# Patient Record
Sex: FEMALE | Race: WHITE | NOT HISPANIC OR LATINO
[De-identification: names, ages, dates, MRNs, and addresses within clinical notes are randomized per-mention and may not be internally consistent; named-entity substitution may affect disease eponyms.]

---

## 2022-05-23 ENCOUNTER — APPOINTMENT (OUTPATIENT)
Dept: ORTHOPEDIC SURGERY | Facility: CLINIC | Age: 72
End: 2022-05-23
Payer: MEDICARE

## 2022-05-23 VITALS — HEIGHT: 61.5 IN | WEIGHT: 232 LBS | BODY MASS INDEX: 43.24 KG/M2

## 2022-05-23 DIAGNOSIS — Z00.00 ENCOUNTER FOR GENERAL ADULT MEDICAL EXAMINATION W/OUT ABNORMAL FINDINGS: ICD-10-CM

## 2022-05-23 DIAGNOSIS — Z60.2 PROBLEMS RELATED TO LIVING ALONE: ICD-10-CM

## 2022-05-23 DIAGNOSIS — I10 ESSENTIAL (PRIMARY) HYPERTENSION: ICD-10-CM

## 2022-05-23 DIAGNOSIS — I51.9 HEART DISEASE, UNSPECIFIED: ICD-10-CM

## 2022-05-23 PROCEDURE — 99213 OFFICE O/P EST LOW 20 MIN: CPT

## 2022-05-23 PROCEDURE — 73564 X-RAY EXAM KNEE 4 OR MORE: CPT | Mod: 50

## 2022-05-23 RX ORDER — ERGOCALCIFEROL 1.25 MG/1
1.25 MG CAPSULE, LIQUID FILLED ORAL
Qty: 24 | Refills: 0 | Status: ACTIVE | COMMUNITY
Start: 2021-12-23

## 2022-05-23 RX ORDER — ATENOLOL 25 MG/1
25 TABLET ORAL
Qty: 90 | Refills: 0 | Status: ACTIVE | COMMUNITY
Start: 2021-12-23

## 2022-05-23 RX ORDER — ZOLPIDEM TARTRATE 10 MG/1
10 TABLET ORAL
Qty: 30 | Refills: 0 | Status: ACTIVE | COMMUNITY
Start: 2022-04-22

## 2022-05-23 RX ORDER — AMLODIPINE BESYLATE AND BENAZEPRIL HYDROCHLORIDE 10; 40 MG/1; MG/1
10-40 CAPSULE ORAL
Qty: 90 | Refills: 0 | Status: ACTIVE | COMMUNITY
Start: 2021-12-23

## 2022-05-23 RX ORDER — SERTRALINE HYDROCHLORIDE 100 MG/1
100 TABLET, FILM COATED ORAL
Qty: 90 | Refills: 0 | Status: ACTIVE | COMMUNITY
Start: 2021-12-23

## 2022-05-23 SDOH — SOCIAL STABILITY - SOCIAL INSECURITY: PROBLEMS RELATED TO LIVING ALONE: Z60.2

## 2022-05-23 NOTE — HISTORY OF PRESENT ILLNESS
[Gradual] : gradual [4] : 4 [0] : 0 [Radiating] : radiating [Nothing helps with pain getting better] : Nothing helps with pain getting better [Sitting] : sitting [Standing] : standing [Walking] : walking [Stairs] : stairs [Retired] : Work status: retired [de-identified] : last note: 06/21/2021: 69YO female patient present for left knee pain, PT denies trauma, PT reports\par weakness, and pain. PT report advil and ice use. PT reports feeling better today.  [] : no [FreeTextEntry1] : knees [FreeTextEntry5] : left knee pain on and off for 3 months\par h/o stenosis and has been treated for it on and off and was seen last summer for rt knee [FreeTextEntry7] : back [de-identified] : rt knee xray  last year [de-identified] : does take 800 mg Ibuprofen

## 2022-05-23 NOTE — PROCEDURE
[Large Joint Injection] : Large joint injection [Bilateral] : bilaterally of the [Knee] : knee [Pain] : pain [Inflammation] : inflammation [X-ray evidence of Osteoarthritis on this or prior visit] : x-ray evidence of Osteoarthritis on this or prior visit [Alcohol] : alcohol [Betadine] : betadine [Ethyl Chloride sprayed topically] : ethyl chloride sprayed topically [Sterile technique used] : sterile technique used [Euflexxa] : Euflexxa [#1] : series #1 [] : Patient tolerated procedure well [Call if redness, pain or fever occur] : call if redness, pain or fever occur [Apply ice for 15min out of every hour for the next 12-24 hours as tolerated] : apply ice for 15 minutes out of every hour for the next 12-24 hours as tolerated [Patient was advised to rest the joint(s) for ____ days] : patient was advised to rest the joint(s) for [unfilled] days [Patient had decreased mobility in the joint] : patient had decreased mobility in the joint [Risks, benefits, alternatives discussed / Verbal consent obtained] : the risks benefits, and alternatives have been discussed, and verbal consent was obtained

## 2022-05-23 NOTE — PHYSICAL EXAM
[Bilateral] : knee bilaterally [Orientated] : orientated [Able to Communicate] : able to communicate [Normal Skin] : normal skin [] : no atrophy [TWNoteComboBox7] : flexion 130 degrees [de-identified] : extension 3 degrees

## 2022-05-23 NOTE — REASON FOR VISIT
[FreeTextEntry2] : follow up rt knee and left knee now bothering her, she is here with a family member. +NSAIDs use.

## 2022-05-23 NOTE — DISCUSSION/SUMMARY
[de-identified] : The documentation recorded by the scribe accurately reflects the service I personally performed and the decisions made by me.\par \par I, Delta Rhodes, attest that this documentation has been prepared under the direction and in the presence of Provider Andres Azul MD.\par \par

## 2022-06-06 ENCOUNTER — APPOINTMENT (OUTPATIENT)
Dept: ORTHOPEDIC SURGERY | Facility: CLINIC | Age: 72
End: 2022-06-06
Payer: MEDICARE

## 2022-06-06 VITALS — BODY MASS INDEX: 43.24 KG/M2 | WEIGHT: 232 LBS | HEIGHT: 61.5 IN

## 2022-06-06 PROCEDURE — 99212 OFFICE O/P EST SF 10 MIN: CPT | Mod: 25

## 2022-06-06 PROCEDURE — 20610 DRAIN/INJ JOINT/BURSA W/O US: CPT | Mod: 50

## 2022-06-06 NOTE — PROCEDURE
[Large Joint Injection] : Large joint injection [Bilateral] : bilaterally of the [Knee] : knee [Pain] : pain [Inflammation] : inflammation [X-ray evidence of Osteoarthritis on this or prior visit] : x-ray evidence of Osteoarthritis on this or prior visit [Alcohol] : alcohol [Betadine] : betadine [Ethyl Chloride sprayed topically] : ethyl chloride sprayed topically [Sterile technique used] : sterile technique used [Euflexxa] : Euflexxa [#2] : series #2 [] : Patient tolerated procedure well [Call if redness, pain or fever occur] : call if redness, pain or fever occur [Apply ice for 15min out of every hour for the next 12-24 hours as tolerated] : apply ice for 15 minutes out of every hour for the next 12-24 hours as tolerated [Patient was advised to rest the joint(s) for ____ days] : patient was advised to rest the joint(s) for [unfilled] days [Patient had decreased mobility in the joint] : patient had decreased mobility in the joint [Risks, benefits, alternatives discussed / Verbal consent obtained] : the risks benefits, and alternatives have been discussed, and verbal consent was obtained

## 2022-06-06 NOTE — DISCUSSION/SUMMARY
[de-identified] : Progress note completed by LIZBETH Carlton.\par \par Physician Instructions:\par The documentation recorded by the PA accurately reflects the service I personally performed and decisions made by me.\par \par

## 2022-06-06 NOTE — PHYSICAL EXAM
[Orientated] : orientated [Able to Communicate] : able to communicate [Normal Skin] : normal skin [Bilateral] : knee bilaterally [] : no atrophy [TWNoteComboBox7] : flexion 130 degrees [de-identified] : extension 3 degrees

## 2022-06-06 NOTE — HISTORY OF PRESENT ILLNESS
[4] : 4 [Localized] : localized [Injection therapy] : injection therapy [Retired] : Work status: retired [2] : 2 [Euflexxa] : Euflexxa [de-identified] : last note: \par 5/23/22: follow up rt knee and left knee now bothering her, she is here with a family member. +NSAIDs use. \par \par 06/21/2021: 71YO female patient present for left knee pain, PT denies trauma, PT reports\par weakness, and pain. PT report advil and ice use. PT reports feeling better today.  [] : no [FreeTextEntry1] : bilat knees [de-identified] : 5-23-22 [de-identified] : bilat knees

## 2022-06-06 NOTE — ASSESSMENT
[FreeTextEntry1] : 6/21/21: Xray of the left knee reveals global adv/endstage OA.\par Advised patient of xray findings with substantial OA.\par Discussed options, ptherapy, nsaids, CSI injections, gel injections\par Intends to travel, [place]par \par 5/23/22: Underlying pathology reviewed and treatment options discussed. \par Xray of the right knee reveals adv PF and medial compartment OA. \par Patient elected to commence with Euflexxa injections for B/l knees. \par Apply ice to affected area.\par preferred Euflexxa over steroid injection. She states the level of pain is tolerable. \par Questions addressed.\par \par 6/6/22: Euflexxa #2 bilateral knees tolerated well.\par RTO in 1 week.

## 2022-06-13 ENCOUNTER — APPOINTMENT (OUTPATIENT)
Dept: ORTHOPEDIC SURGERY | Facility: CLINIC | Age: 72
End: 2022-06-13

## 2022-06-13 VITALS — HEIGHT: 61.5 IN | BODY MASS INDEX: 43.24 KG/M2 | WEIGHT: 232 LBS

## 2022-06-13 PROCEDURE — 20610 DRAIN/INJ JOINT/BURSA W/O US: CPT | Mod: 50

## 2022-06-13 PROCEDURE — 99213 OFFICE O/P EST LOW 20 MIN: CPT | Mod: 25

## 2022-06-13 NOTE — HISTORY OF PRESENT ILLNESS
[Euflexxa] : Euflexxa [4] : 4 [Localized] : localized [Injection therapy] : injection therapy [Walking] : walking [Retired] : Work status: retired [2] : 2 [de-identified] : last note: \par 5/23/22: follow up rt knee and left knee now bothering her, she is here with a family member. +NSAIDs use. \par \par 06/21/2021: 69YO female patient present for left knee pain, PT denies trauma, PT reports\par weakness, and pain. PT report advil and ice use. PT reports feeling better today. \par \par 6/6/22: Euflexxa #2 bilateral knees. \par  [] : no [FreeTextEntry1] : bilat knees [de-identified] : 5-23-22 [de-identified] : bilat knees [de-identified] : euflexxa

## 2022-06-13 NOTE — PHYSICAL EXAM
[Orientated] : orientated [Able to Communicate] : able to communicate [Normal Skin] : normal skin [Bilateral] : knee bilaterally [] : no atrophy [TWNoteComboBox7] : flexion 130 degrees [de-identified] : extension 3 degrees

## 2022-06-13 NOTE — DISCUSSION/SUMMARY
[de-identified] : The documentation recorded by the scribe accurately reflects the service I personally performed and the decisions made by me.\par I, Sandrine Thakkar, attest that this documentation has been prepared under the direction and in the presence of Provider Andres Azul MD.\par \par The patient was seen by Andres Azul MD\par

## 2022-06-13 NOTE — ASSESSMENT
[FreeTextEntry1] : 6/21/21: Xray of the left knee reveals global adv/endstage OA.\par Advised patient of xray findings with substantial OA.\par Discussed options, ptherapy, nsaids, CSI injections, gel injections\par Intends to travel, may do injections [place]state.\par \par 5/23/22: Underlying pathology reviewed and treatment options discussed. \par Xray of the right knee reveals adv PF and medial compartment OA. \par Patient elected to commence with Euflexxa injections for B/l knees. \par Apply ice to affected area.\par preferred Euflexxa over steroid injection. She states the level of pain is tolerable. \par Questions addressed.\par \par 6/6/22: Euflexxa #2 bilateral knees tolerated well.\par RTO in 1 week\par \par 6/13/22: Euflexxa #3 bilateral knees tolerated well.\par RTO 6 weeks/prn.

## 2022-06-13 NOTE — PROCEDURE
[Large Joint Injection] : Large joint injection [Bilateral] : bilaterally of the [Knee] : knee [Pain] : pain [Inflammation] : inflammation [X-ray evidence of Osteoarthritis on this or prior visit] : x-ray evidence of Osteoarthritis on this or prior visit [Alcohol] : alcohol [Betadine] : betadine [Ethyl Chloride sprayed topically] : ethyl chloride sprayed topically [Sterile technique used] : sterile technique used [Euflexxa] : Euflexxa [#3] : series #3 [] : Patient tolerated procedure well [Call if redness, pain or fever occur] : call if redness, pain or fever occur [Apply ice for 15min out of every hour for the next 12-24 hours as tolerated] : apply ice for 15 minutes out of every hour for the next 12-24 hours as tolerated [Patient was advised to rest the joint(s) for ____ days] : patient was advised to rest the joint(s) for [unfilled] days [Patient had decreased mobility in the joint] : patient had decreased mobility in the joint [Risks, benefits, alternatives discussed / Verbal consent obtained] : the risks benefits, and alternatives have been discussed, and verbal consent was obtained

## 2022-11-14 ENCOUNTER — APPOINTMENT (OUTPATIENT)
Dept: ORTHOPEDIC SURGERY | Facility: CLINIC | Age: 72
End: 2022-11-14

## 2022-11-25 ENCOUNTER — FORM ENCOUNTER (OUTPATIENT)
Age: 72
End: 2022-11-25

## 2022-11-26 ENCOUNTER — APPOINTMENT (OUTPATIENT)
Dept: MRI IMAGING | Facility: CLINIC | Age: 72
End: 2022-11-26

## 2022-11-26 PROCEDURE — 70551 MRI BRAIN STEM W/O DYE: CPT | Mod: MH

## 2023-04-17 ENCOUNTER — APPOINTMENT (OUTPATIENT)
Dept: ORTHOPEDIC SURGERY | Facility: CLINIC | Age: 73
End: 2023-04-17
Payer: MEDICARE

## 2023-04-17 VITALS — WEIGHT: 214 LBS | HEIGHT: 61 IN | BODY MASS INDEX: 40.4 KG/M2

## 2023-04-17 PROCEDURE — 99214 OFFICE O/P EST MOD 30 MIN: CPT

## 2023-04-17 RX ORDER — GABAPENTIN 100 MG/1
100 CAPSULE ORAL 3 TIMES DAILY
Qty: 42 | Refills: 0 | Status: ACTIVE | COMMUNITY
Start: 2023-04-17 | End: 1900-01-01

## 2023-04-17 NOTE — REVIEW OF SYSTEMS
----- Message from Jeromy Santos MD sent at 4/12/2018  8:47 AM CDT -----  1 hr glucola 155.   Needs 3 hr GTT [Joint Pain] : joint pain [Negative] : Heme/Lymph

## 2023-04-18 NOTE — ASSESSMENT
[FreeTextEntry1] : 6/21/21: Xray of the left knee reveals global adv/endstage OA.\par Advised patient of xray findings with substantial OA.\par Discussed options, ptherapy, nsaids, CSI injections, gel injections\par Intends to travel, may do injections upstate.\par \par 5/23/22: Underlying pathology reviewed and treatment options discussed. \par Xray of the right knee reveals adv PF and medial compartment OA. \par Patient elected to commence with Euflexxa injections for B/l knees. \par Apply ice to affected area.\par preferred Euflexxa over steroid injection. She states the level of pain is tolerable. \par Questions addressed.\par \par 6/6/22: Euflexxa #2 bilateral knees tolerated well.\par RTO in 1 week\par \par 6/13/22: Euflexxa #3 bilateral knees tolerated well.\par RTO 6 weeks/prn.\par \par 04/17/2023: She states haven taken her friends gabapentin with good relief and requested a prescription. \par gabapentin for 2 wks was prescribed. Side affects discussed. \par She was advised she must follow up with her internist for continuing RX. \par Questions addressed.\par Activity modifier as tolerated.\par \par The documentation recorded by the scribe accurately reflects the service I personally performed and the decisions made by me.\par I, Sandrine Thakkar, attest that this documentation has been prepared under the direction and in the presence of Provider Andres Azul MD.\par \par The patient was seen by Andres Azul MD.\par

## 2023-04-18 NOTE — PHYSICAL EXAM
[Orientated] : orientated [Able to Communicate] : able to communicate [Normal Skin] : normal skin [Bilateral] : knee bilaterally [] : no atrophy [TWNoteComboBox7] : flexion 130 degrees [de-identified] : extension 3 degrees

## 2023-04-18 NOTE — HISTORY OF PRESENT ILLNESS
[0] : 0 [Localized] : localized [Injection therapy] : injection therapy [Retired] : Work status: retired [de-identified] : 4/17/23: This is a 72 year old F with left knee pain after a fall in a parking garage. [] : no [FreeTextEntry1] : left knee [FreeTextEntry5] : pt fell in a parking garage and went to urgent care, had some achiness but is feeling better [FreeTextEntry9] : gabapentin

## 2023-11-27 ENCOUNTER — APPOINTMENT (OUTPATIENT)
Dept: ORTHOPEDIC SURGERY | Facility: CLINIC | Age: 73
End: 2023-11-27
Payer: MEDICARE

## 2023-11-27 VITALS — BODY MASS INDEX: 40.4 KG/M2 | HEIGHT: 61 IN | WEIGHT: 214 LBS

## 2023-11-27 PROCEDURE — 20610 DRAIN/INJ JOINT/BURSA W/O US: CPT | Mod: LT

## 2023-11-27 PROCEDURE — 99213 OFFICE O/P EST LOW 20 MIN: CPT | Mod: 25

## 2023-12-07 ENCOUNTER — APPOINTMENT (OUTPATIENT)
Dept: ORTHOPEDIC SURGERY | Facility: CLINIC | Age: 73
End: 2023-12-07
Payer: MEDICARE

## 2023-12-07 PROCEDURE — 20610 DRAIN/INJ JOINT/BURSA W/O US: CPT | Mod: 50

## 2023-12-11 ENCOUNTER — APPOINTMENT (OUTPATIENT)
Dept: ORTHOPEDIC SURGERY | Facility: CLINIC | Age: 73
End: 2023-12-11

## 2023-12-14 ENCOUNTER — APPOINTMENT (OUTPATIENT)
Dept: ORTHOPEDIC SURGERY | Facility: CLINIC | Age: 73
End: 2023-12-14
Payer: MEDICARE

## 2023-12-14 DIAGNOSIS — M17.0 BILATERAL PRIMARY OSTEOARTHRITIS OF KNEE: ICD-10-CM

## 2023-12-14 PROCEDURE — 20610 DRAIN/INJ JOINT/BURSA W/O US: CPT | Mod: LT

## 2023-12-14 RX ORDER — MELOXICAM 15 MG/1
15 TABLET ORAL
Qty: 30 | Refills: 1 | Status: ACTIVE | COMMUNITY
Start: 2023-12-14 | End: 1900-01-01

## 2023-12-14 NOTE — PHYSICAL EXAM
[Orientated] : orientated [Able to Communicate] : able to communicate [Normal Skin] : normal skin [Left] : left knee [] : no atrophy [TWNoteComboBox7] : flexion 130 degrees [de-identified] : extension 3 degrees

## 2023-12-14 NOTE — HISTORY OF PRESENT ILLNESS
[8] : 8 [Localized] : localized [Walking] : walking [Stairs] : stairs [Retired] : Work status: retired [3] : 3 [Euflexxa] : Euflexxa [de-identified] : 12/14/2023: Left knee Euflexxa #3. Symptoms continue. Denies complication with prior injection.  12/07/2023: Left knee Euflexxa #2. Symptoms continue. Denies complication with prior injection.  11/27/2023: Patient presents with left knee pain that has returned over the past few weeks. No new injury. Describes stiffness and difficulty with prolonged walking. Denies buckling. She had significant relief with Euflexxa previously.  4/17/23: This is a 72 year old F with left knee pain after a fall in a parking garage. [] : no [FreeTextEntry1] : left knee [FreeTextEntry5] : pt fell in a parking garage and went to urgent care, had some achiness but is feeling better [FreeTextEntry9] : lidocaine  [de-identified] : getting up, pt is limping  [de-identified] : 12/7/23 [de-identified] : left knee

## 2023-12-14 NOTE — PROCEDURE
[FreeTextEntry3] : Viscosupplementation Injection: X-ray evidence of osteoarthritis on this or prior visit and patient has tried OTC's including aspirin, Ibuprofen, Aleve etc or prescription NSAIDS, and/or exercises at home and/ or physical therapy without satisfactory response.   An injection of Euflexxa 2.5ml #3 was injected into the left knee(s) after verbal consent obtained.   Patient has tried OTC's including aspirin, Ibuprofen, Aleve etc or prescription NSAIDS, and/or exercises at home and/ or physical therapy without satisfactory response and Patient has decreased mobility in the joint. The risks, benefits, and alternatives to cortisone injection were explained in full to the patient. Risks outlined include but are not limited to infection, sepsis, bleeding, scarring, skin discoloration, temporary increase in pain, syncopal episode, failure to resolve symptoms, allergic reaction, and symptom recurrence. Patient understands the risks. All questions were answered. After discussion of options, patient requested an injection. Oral informed consent was obtained. Sterile technique was utilized for the procedure including the preparation of the solutions used for the injection. Injection site was prepped with betadine and alcohol. Ethyl chloride sprayed topically. Sterile technique used. Patient tolerated procedure well.   Post Procedure Instructions: Patient was advised to call if redness, pain, or fever occur. Apply ice for 15 min. every 2 hours for the next 12-24 hours as tolerated. Patient was advised to rest the joint(s) for 1-2 days.

## 2024-01-11 ENCOUNTER — APPOINTMENT (OUTPATIENT)
Dept: ORTHOPEDIC SURGERY | Facility: CLINIC | Age: 74
End: 2024-01-11

## 2024-01-14 NOTE — ASSESSMENT
[FreeTextEntry1] : 6/21/21: Xray of the left knee reveals global adv/endstage OA. Advised patient of xray findings with substantial OA. Discussed options, ptherapy, nsaids, CSI injections, gel injections Intends to travel, may do injections upstate.  5/23/22: Underlying pathology reviewed and treatment options discussed. Xray of the right knee reveals adv PF and medial compartment OA. Patient elected to commence with Euflexxa injections for B/l knees. Apply ice to affected area. preferred Euflexxa over steroid injection. She states the level of pain is tolerable. Questions addressed.  6/6/22: Euflexxa #2 bilateral knees tolerated well. RTO in 1 week  6/13/22: Euflexxa #3 bilateral knees tolerated well. RTO 6 weeks/prn.  04/17/2023: She states haven taken her friends gabapentin with good relief and requested a prescription. gabapentin for 2 wks was prescribed. Side affects discussed. She was advised she must follow up with her internist for continuing RX. Questions addressed. Activity modifier as tolerated.  11/27/2023: Treatment options reviewed. Discussed repeat visco injections and patient elects to proceed. Left knee Euflexxa injection tolerated well. Ice if sore. Return in 1 week to continue.  12/07/2023: Left knee Euflexxa #2 tolerated well. Ice if sore. RTO 1 week to continue.   12/14/2023: Left knee Euflexxa #3 tolerated well. Ice if sore. Follow up in 6 weeks if symptoms persist. 
bilateral LE Active ROM was WFL  (within functional limits)